# Patient Record
Sex: FEMALE | Race: WHITE | URBAN - METROPOLITAN AREA
[De-identification: names, ages, dates, MRNs, and addresses within clinical notes are randomized per-mention and may not be internally consistent; named-entity substitution may affect disease eponyms.]

---

## 2018-08-02 ENCOUNTER — OFFICE VISIT (OUTPATIENT)
Dept: URBAN - METROPOLITAN AREA CLINIC 48 | Facility: CLINIC | Age: 78
End: 2018-08-02
Payer: MEDICARE

## 2018-08-02 DIAGNOSIS — H43.813 VITREOUS DEGENERATION, BILATERAL: ICD-10-CM

## 2018-08-02 PROCEDURE — 92133 CPTRZD OPH DX IMG PST SGM ON: CPT | Performed by: OPHTHALMOLOGY

## 2018-08-02 PROCEDURE — 76514 ECHO EXAM OF EYE THICKNESS: CPT | Performed by: OPHTHALMOLOGY

## 2018-08-02 PROCEDURE — 92004 COMPRE OPH EXAM NEW PT 1/>: CPT | Performed by: OPHTHALMOLOGY

## 2018-08-02 PROCEDURE — 92083 EXTENDED VISUAL FIELD XM: CPT | Performed by: OPHTHALMOLOGY

## 2018-08-02 PROCEDURE — 92014 COMPRE OPH EXAM EST PT 1/>: CPT | Performed by: OPHTHALMOLOGY

## 2018-08-02 PROCEDURE — 92020 GONIOSCOPY: CPT | Performed by: OPHTHALMOLOGY

## 2018-08-02 ASSESSMENT — INTRAOCULAR PRESSURE
OS: 19
OD: 19

## 2018-08-02 NOTE — IMPRESSION/PLAN
Impression: Primary open-angle glaucoma, bilateral, moderate stage: G81.4093. Plan: Discussed and reviewed diagnosis with patient today, understood by patient, discussed reviewed VF and OCT with patient today, intraocular pressure stable with medication. Continue medications and observe. Importance of compliance with medications and regular follow-up reiterated, will continue to monitor. Patient to continue latanoprost qhs ou.

## 2018-11-13 ENCOUNTER — OFFICE VISIT (OUTPATIENT)
Dept: URBAN - METROPOLITAN AREA CLINIC 48 | Facility: CLINIC | Age: 78
End: 2018-11-13
Payer: MEDICARE

## 2018-11-13 DIAGNOSIS — H40.1132 PRIMARY OPEN-ANGLE GLAUCOMA, BILATERAL, MODERATE STAGE: Primary | ICD-10-CM

## 2018-11-13 DIAGNOSIS — H04.123 DRY EYE SYNDROME OF BILATERAL LACRIMAL GLANDS: ICD-10-CM

## 2018-11-13 PROCEDURE — 92012 INTRM OPH EXAM EST PATIENT: CPT | Performed by: OPHTHALMOLOGY

## 2018-11-13 ASSESSMENT — INTRAOCULAR PRESSURE
OS: 18
OD: 18

## 2018-11-13 NOTE — IMPRESSION/PLAN
Impression: Dry eye syndrome of bilateral lacrimal glands: H04.123. Plan: Dry eyes account for the patient's complaints. There is no evidence of permanent changes to the cornea. Explained condition does not have a cure and will need artificial tears for maintenance. Patient to start artifical tears as needed for comfort in both eyes tid to qid ou. IF patient does not see the improvement with AFTS then she can get new refraction.

## 2018-11-13 NOTE — IMPRESSION/PLAN
Impression: Primary open-angle glaucoma, bilateral, moderate stage: A09.1287. Plan: Discussed and reviewed diagnosis with patient today, understood by patient, intraocular pressure stable with medication. Continue medications and observe. Importance of compliance with medications and regular follow-up reiterated, will continue to monitor. Patient to continue Latanoprost qhs ou.

## 2019-03-14 ENCOUNTER — OFFICE VISIT (OUTPATIENT)
Dept: URBAN - METROPOLITAN AREA CLINIC 48 | Facility: CLINIC | Age: 79
End: 2019-03-14
Payer: MEDICARE

## 2019-03-14 PROCEDURE — 99213 OFFICE O/P EST LOW 20 MIN: CPT | Performed by: OPHTHALMOLOGY

## 2019-03-14 ASSESSMENT — INTRAOCULAR PRESSURE
OS: 21
OD: 24

## 2019-03-14 NOTE — IMPRESSION/PLAN
Impression: Primary open-angle glaucoma, bilateral, moderate stage: M13.5940. Plan: IOP elevated on today's visit, patient did miss eye drop last night but used this AM. Will continue to monitor IOP closely. Continue with Latanoprost qhs ou.

## 2019-04-18 ENCOUNTER — OFFICE VISIT (OUTPATIENT)
Dept: URBAN - METROPOLITAN AREA CLINIC 48 | Facility: CLINIC | Age: 79
End: 2019-04-18
Payer: MEDICARE

## 2019-04-18 PROCEDURE — 92012 INTRM OPH EXAM EST PATIENT: CPT | Performed by: OPHTHALMOLOGY

## 2019-04-18 RX ORDER — LATANOPROST 50 UG/ML
0.005 % SOLUTION OPHTHALMIC
Qty: 1 | Refills: 8 | Status: INACTIVE
Start: 2019-04-18 | End: 2019-04-18

## 2019-04-18 RX ORDER — LATANOPROST 50 UG/ML
0.005 % SOLUTION OPHTHALMIC
Qty: 3 | Refills: 4 | Status: INACTIVE
Start: 2019-04-18 | End: 2021-03-29

## 2019-04-18 ASSESSMENT — INTRAOCULAR PRESSURE
OD: 17
OS: 16

## 2019-04-18 NOTE — IMPRESSION/PLAN
Impression: Primary open-angle glaucoma, bilateral, moderate stage: I01.4386. Plan: IOP at goal, continue same meds. Latanoprost qhs ou.

## 2019-08-22 ENCOUNTER — OFFICE VISIT (OUTPATIENT)
Dept: URBAN - METROPOLITAN AREA CLINIC 48 | Facility: CLINIC | Age: 79
End: 2019-08-22
Payer: MEDICARE

## 2019-08-22 PROCEDURE — 92012 INTRM OPH EXAM EST PATIENT: CPT | Performed by: OPHTHALMOLOGY

## 2019-08-22 ASSESSMENT — INTRAOCULAR PRESSURE
OD: 17
OS: 16

## 2019-08-22 NOTE — IMPRESSION/PLAN
Impression: Primary open-angle glaucoma, bilateral, moderate stage: J18.2797. Plan: Discussed and reviewed diagnosis with patient today, understood by patient, intraocular pressure stable with medication. Continue medications and observe. Importance of compliance with medications and regular follow-up reiterated, will continue to monitor. Patient to continue Latanoprost qhs ou.

## 2021-03-29 ENCOUNTER — OFFICE VISIT (OUTPATIENT)
Dept: URBAN - METROPOLITAN AREA CLINIC 48 | Facility: CLINIC | Age: 81
End: 2021-03-29
Payer: MEDICARE

## 2021-03-29 PROCEDURE — 99214 OFFICE O/P EST MOD 30 MIN: CPT | Performed by: OPHTHALMOLOGY

## 2021-03-29 PROCEDURE — 92083 EXTENDED VISUAL FIELD XM: CPT | Performed by: OPHTHALMOLOGY

## 2021-03-29 PROCEDURE — 92133 CPTRZD OPH DX IMG PST SGM ON: CPT | Performed by: OPHTHALMOLOGY

## 2021-03-29 RX ORDER — LATANOPROST 50 UG/ML
0.005 % SOLUTION OPHTHALMIC
Qty: 3 | Refills: 4 | Status: INACTIVE
Start: 2021-03-29 | End: 2022-03-17

## 2021-03-29 ASSESSMENT — INTRAOCULAR PRESSURE
OD: 23
OS: 23

## 2021-03-29 NOTE — ASSESSMENT/PLAN
Impression: Visual Field - OD: Poor-GHT abnormal paracentral VF loss; OS: Poor-GHT borderline Plan: see plan

## 2021-03-29 NOTE — IMPRESSION/PLAN
Impression: Primary open-angle glaucoma, bilateral, moderate stage: Z89.0389. Plan: IOP above target OU. Discussed with patient the importance of continued eye care, and regularly using eye drops. Patient to continue Latanoprost qhs OU. (erx refill sent) RTC 4 months for IOP check.

## 2022-03-17 ENCOUNTER — OFFICE VISIT (OUTPATIENT)
Dept: URBAN - METROPOLITAN AREA CLINIC 48 | Facility: CLINIC | Age: 82
End: 2022-03-17
Payer: MEDICARE

## 2022-03-17 PROCEDURE — 92083 EXTENDED VISUAL FIELD XM: CPT | Performed by: OPHTHALMOLOGY

## 2022-03-17 PROCEDURE — 99214 OFFICE O/P EST MOD 30 MIN: CPT | Performed by: OPHTHALMOLOGY

## 2022-03-17 PROCEDURE — 92133 CPTRZD OPH DX IMG PST SGM ON: CPT | Performed by: OPHTHALMOLOGY

## 2022-03-17 RX ORDER — LATANOPROST 50 UG/ML
0.005 % SOLUTION OPHTHALMIC
Qty: 7.5 | Refills: 4 | Status: ACTIVE
Start: 2022-03-17

## 2022-03-17 ASSESSMENT — INTRAOCULAR PRESSURE
OS: 23
OD: 21

## 2022-03-17 NOTE — IMPRESSION/PLAN
Impression: Primary open-angle glaucoma, bilateral, moderate stage: N87.0402. Plan: IOP borderline elevated over last few visits, will monitor closely. Testing performed today, discussed findings with patient. 
Continue: Latanoprost QHS OU


RTC May 2022

## 2023-03-27 ENCOUNTER — OFFICE VISIT (OUTPATIENT)
Dept: URBAN - METROPOLITAN AREA CLINIC 50 | Facility: CLINIC | Age: 83
End: 2023-03-27
Payer: MEDICARE

## 2023-03-27 DIAGNOSIS — Z96.1 PRESENCE OF INTRAOCULAR LENS: ICD-10-CM

## 2023-03-27 DIAGNOSIS — H01.002 UNSPECIFIED BLEPHARITIS OF RIGHT LOWER EYELID: ICD-10-CM

## 2023-03-27 DIAGNOSIS — H43.393 OTHER VITREOUS OPACITIES, BILATERAL: ICD-10-CM

## 2023-03-27 DIAGNOSIS — H16.223 KERATOCONJUNCTIVITIS SICCA, BILATERAL: ICD-10-CM

## 2023-03-27 DIAGNOSIS — H40.1132 PRIMARY OPEN-ANGLE GLAUCOMA, MODERATE STAGE, BILATERAL: Primary | ICD-10-CM

## 2023-03-27 DIAGNOSIS — H01.005 UNSPECIFIED BLEPHARITIS OF LEFT LOWER EYELID: ICD-10-CM

## 2023-03-27 PROCEDURE — 99204 OFFICE O/P NEW MOD 45 MIN: CPT | Performed by: OPHTHALMOLOGY

## 2023-03-27 PROCEDURE — 92020 GONIOSCOPY: CPT | Performed by: OPHTHALMOLOGY

## 2023-03-27 RX ORDER — BRIMONIDINE TARTRATE 2 MG/ML
0.2 % SOLUTION/ DROPS OPHTHALMIC
Qty: 10 | Refills: 11 | Status: ACTIVE
Start: 2023-03-27

## 2023-03-27 ASSESSMENT — INTRAOCULAR PRESSURE
OS: 22
OD: 22
OS: 16
OD: 16

## 2023-03-27 NOTE — IMPRESSION/PLAN
Impression: Keratoconjunctivitis sicca, bilateral: V42.790. Plan: Patient advised that dry eyes may be the cause of symptoms. Advised to use artificial tears as needed.

## 2023-03-27 NOTE — IMPRESSION/PLAN
Impression: Primary open-angle glaucoma, moderate stage, bilateral: H40.9752. Plan: Patient advised that intraocular pressure is not at acceptable range. Recommend additional glaucoma drops. - Continue as previously prescribed Latanoprost 0.005 % eye drops : Instill one drop in both eyes at bedtime  - Begin Brimonidine 0.2% : Instill one drop in both eyes twice daily

## 2023-03-27 NOTE — IMPRESSION/PLAN
Impression: Unspecified blepharitis of right lower eyelid: H01.002. Plan: Products recommended for lid hygiene; Ocusoft, Avenova or Heyedrate lid cleanser OTC, e.g., Warm wet compresses helps liquifies blocked up oil glands. As well as daily eyelid cleaning.

## 2023-05-12 ENCOUNTER — TESTING ONLY (OUTPATIENT)
Dept: URBAN - METROPOLITAN AREA CLINIC 50 | Facility: CLINIC | Age: 83
End: 2023-05-12
Payer: MEDICARE

## 2023-05-12 DIAGNOSIS — H40.1132 PRIMARY OPEN-ANGLE GLAUCOMA, BILATERAL, MODERATE STAGE: Primary | ICD-10-CM

## 2023-05-15 ENCOUNTER — OFFICE VISIT (OUTPATIENT)
Dept: URBAN - METROPOLITAN AREA CLINIC 50 | Facility: CLINIC | Age: 83
End: 2023-05-15
Payer: MEDICARE

## 2023-05-15 DIAGNOSIS — H40.1132 PRIMARY OPEN-ANGLE GLAUCOMA, BILATERAL, MODERATE STAGE: Primary | ICD-10-CM

## 2023-05-15 PROCEDURE — 76514 ECHO EXAM OF EYE THICKNESS: CPT | Performed by: OPHTHALMOLOGY

## 2023-05-15 PROCEDURE — 92083 EXTENDED VISUAL FIELD XM: CPT | Performed by: OPHTHALMOLOGY

## 2023-05-15 PROCEDURE — 99213 OFFICE O/P EST LOW 20 MIN: CPT | Performed by: OPHTHALMOLOGY

## 2023-05-15 PROCEDURE — 92133 CPTRZD OPH DX IMG PST SGM ON: CPT | Performed by: OPHTHALMOLOGY

## 2023-05-15 ASSESSMENT — INTRAOCULAR PRESSURE
OS: 20
OS: 16
OD: 15
OD: 20

## 2023-05-15 NOTE — IMPRESSION/PLAN
Impression: Primary open-angle glaucoma, bilateral, moderate stage: V79.0445. Plan: Patient advised that intraocular pressure is not at acceptable range. Recommend additional glaucoma drops.  Rocklatan OD and D/C Latanoprost  Pt aware eye might get red with Rocklatan - Continue as previously prescribed Brimonidine 0.2% : Instill one drop in both eyes twice daily Gave sample of Rocklatan

## 2023-05-25 ENCOUNTER — OFFICE VISIT (OUTPATIENT)
Dept: URBAN - METROPOLITAN AREA CLINIC 50 | Facility: CLINIC | Age: 83
End: 2023-05-25
Payer: MEDICARE

## 2023-05-25 DIAGNOSIS — H52.4 PRESBYOPIA: Primary | ICD-10-CM

## 2023-05-25 ASSESSMENT — VISUAL ACUITY
OD: 20/40
OS: 20/25

## 2023-06-12 ENCOUNTER — OFFICE VISIT (OUTPATIENT)
Dept: URBAN - METROPOLITAN AREA CLINIC 50 | Facility: CLINIC | Age: 83
End: 2023-06-12
Payer: MEDICARE

## 2023-06-12 DIAGNOSIS — H16.223 KERATOCONJUNCTIVITIS SICCA, BILATERAL: ICD-10-CM

## 2023-06-12 DIAGNOSIS — H40.1132 PRIMARY OPEN-ANGLE GLAUCOMA, BILATERAL, MODERATE STAGE: Primary | ICD-10-CM

## 2023-06-12 DIAGNOSIS — H01.002 UNSPECIFIED BLEPHARITIS OF RIGHT LOWER EYELID: ICD-10-CM

## 2023-06-12 DIAGNOSIS — H43.393 OTHER VITREOUS OPACITIES, BILATERAL: ICD-10-CM

## 2023-06-12 DIAGNOSIS — H01.005 UNSPECIFIED BLEPHARITIS OF LEFT LOWER EYELID: ICD-10-CM

## 2023-06-12 PROCEDURE — 99213 OFFICE O/P EST LOW 20 MIN: CPT | Performed by: OPHTHALMOLOGY

## 2023-06-12 RX ORDER — NETARSUDIL AND LATANOPROST OPHTHALMIC SOLUTION, 0.02%/0.005% .2; .05 MG/ML; MG/ML
SOLUTION/ DROPS OPHTHALMIC; TOPICAL
Qty: 7.5 | Refills: 11 | Status: ACTIVE
Start: 2023-06-12

## 2023-06-12 ASSESSMENT — INTRAOCULAR PRESSURE
OD: 14
OS: 18
OD: 16

## 2023-06-12 NOTE — IMPRESSION/PLAN
Impression: Primary open-angle glaucoma, bilateral, moderate stage: G29.2145. Plan: Patient advised that intraocular pressure is not at acceptable range OS, within range in the right. 

 - d/c  Brimonidine 0.2% : Instill one drop in both eyes twice daily - Change  Rocklatan 0.02% - 0.05% eye drops : Instill one drop in both eyes at bedtime

## 2023-06-12 NOTE — IMPRESSION/PLAN
Impression: Keratoconjunctivitis sicca, bilateral: X30.479. Plan: Patient advised that dry eyes may be the cause of symptoms. Advised to use artificial tears as needed.

## 2024-07-25 ENCOUNTER — OFFICE VISIT (OUTPATIENT)
Dept: URBAN - METROPOLITAN AREA CLINIC 50 | Facility: CLINIC | Age: 84
End: 2024-07-25
Payer: MEDICARE

## 2024-07-25 DIAGNOSIS — H40.1132 PRIMARY OPEN-ANGLE GLAUCOMA, BILATERAL, MODERATE STAGE: Primary | ICD-10-CM

## 2024-07-25 DIAGNOSIS — H16.223 KERATOCONJUNCTIVITIS SICCA, BILATERAL: ICD-10-CM

## 2024-07-25 PROCEDURE — 99214 OFFICE O/P EST MOD 30 MIN: CPT | Performed by: OPTOMETRIST

## 2024-07-25 RX ORDER — TIMOLOL MALEATE 5 MG/ML
0.5 % SOLUTION/ DROPS OPHTHALMIC
Qty: 10 | Refills: 11 | Status: ACTIVE
Start: 2024-07-25

## 2024-07-25 ASSESSMENT — INTRAOCULAR PRESSURE
OS: 20
OS: 21
OD: 24
OD: 20

## 2024-08-23 ENCOUNTER — OFFICE VISIT (OUTPATIENT)
Dept: URBAN - METROPOLITAN AREA CLINIC 50 | Facility: CLINIC | Age: 84
End: 2024-08-23
Payer: MEDICARE

## 2024-08-23 DIAGNOSIS — H16.223 KERATOCONJUNCTIVITIS SICCA, BILATERAL: ICD-10-CM

## 2024-08-23 DIAGNOSIS — H40.1132 PRIMARY OPEN-ANGLE GLAUCOMA, BILATERAL, MODERATE STAGE: Primary | ICD-10-CM

## 2024-08-23 PROCEDURE — 92083 EXTENDED VISUAL FIELD XM: CPT | Performed by: OPTOMETRIST

## 2024-08-23 PROCEDURE — 92133 CPTRZD OPH DX IMG PST SGM ON: CPT | Performed by: OPTOMETRIST

## 2024-08-23 PROCEDURE — 99213 OFFICE O/P EST LOW 20 MIN: CPT | Performed by: OPTOMETRIST

## 2024-08-23 RX ORDER — NETARSUDIL AND LATANOPROST OPHTHALMIC SOLUTION, 0.02%/0.005% .2; .05 MG/ML; MG/ML
SOLUTION/ DROPS OPHTHALMIC; TOPICAL
Qty: 7.5 | Refills: 3 | Status: ACTIVE
Start: 2024-08-23

## 2024-08-23 ASSESSMENT — INTRAOCULAR PRESSURE
OS: 16
OD: 17